# Patient Record
Sex: FEMALE | Employment: UNEMPLOYED | URBAN - METROPOLITAN AREA
[De-identification: names, ages, dates, MRNs, and addresses within clinical notes are randomized per-mention and may not be internally consistent; named-entity substitution may affect disease eponyms.]

---

## 2018-07-09 ENCOUNTER — HOSPITAL ENCOUNTER (OUTPATIENT)
Dept: LAB | Age: 38
Discharge: HOME | End: 2018-07-09
Payer: OTHER GOVERNMENT

## 2018-07-09 LAB
CHLAMYDIA, EXTERNAL: NEGATIVE
HBSAG, EXTERNAL: NEGATIVE
HIV, EXTERNAL: NEGATIVE
N. GONORRHEA, EXTERNAL: NEGATIVE
RPR, EXTERNAL: NEGATIVE
RUBELLA, EXTERNAL: NORMAL
TYPE, ABO & RH, EXTERNAL: NORMAL

## 2018-07-09 PROCEDURE — 88175 CYTOPATH C/V AUTO FLUID REDO: CPT | Performed by: OBSTETRICS & GYNECOLOGY

## 2018-12-19 ENCOUNTER — OFFICE VISIT (OUTPATIENT)
Dept: CARDIOLOGY CLINIC | Age: 38
End: 2018-12-19

## 2018-12-19 VITALS
SYSTOLIC BLOOD PRESSURE: 112 MMHG | WEIGHT: 151 LBS | OXYGEN SATURATION: 98 % | HEART RATE: 99 BPM | DIASTOLIC BLOOD PRESSURE: 74 MMHG

## 2018-12-19 DIAGNOSIS — Z3A.34 34 WEEKS GESTATION OF PREGNANCY: ICD-10-CM

## 2018-12-19 DIAGNOSIS — J45.909 UNCOMPLICATED ASTHMA, UNSPECIFIED ASTHMA SEVERITY, UNSPECIFIED WHETHER PERSISTENT: ICD-10-CM

## 2018-12-19 DIAGNOSIS — R00.2 PALPITATIONS: Primary | ICD-10-CM

## 2018-12-19 DIAGNOSIS — R06.02 SOB (SHORTNESS OF BREATH): ICD-10-CM

## 2018-12-19 DIAGNOSIS — R06.09 DOE (DYSPNEA ON EXERTION): ICD-10-CM

## 2018-12-19 RX ORDER — CLONAZEPAM 2 MG/1
2 TABLET ORAL
COMMUNITY
End: 2018-12-19 | Stop reason: ALTCHOICE

## 2018-12-19 RX ORDER — SWAB
SWAB, NON-MEDICATED MISCELLANEOUS
COMMUNITY
Start: 2010-11-23

## 2018-12-19 RX ORDER — ESOMEPRAZOLE MAGNESIUM 40 MG/1
40 CAPSULE, DELAYED RELEASE ORAL
COMMUNITY
End: 2018-12-19 | Stop reason: ALTCHOICE

## 2018-12-19 NOTE — PROGRESS NOTES
1. Have you been to the ER, urgent care clinic since your last visit? Hospitalized since your last visit? NP_ No     2. Have you seen or consulted any other health care providers outside of the [address] [place] [address] since your last visit? Include any pap smears or colon screening.   NP_ Yes

## 2018-12-26 ENCOUNTER — HOSPITAL ENCOUNTER (OUTPATIENT)
Dept: NON INVASIVE DIAGNOSTICS | Age: 38
Discharge: HOME | End: 2018-12-26
Attending: INTERNAL MEDICINE
Payer: OTHER GOVERNMENT

## 2018-12-26 DIAGNOSIS — R00.2 PALPITATIONS: ICD-10-CM

## 2018-12-26 DIAGNOSIS — R06.02 SOB (SHORTNESS OF BREATH): ICD-10-CM

## 2018-12-26 LAB
ECHO AV PEAK GRADIENT: 0 MMHG
ECHO AV PEAK VELOCITY: 0 CM/S
ECHO EST RA PRESSURE: 10 MMHG
ECHO LA VOL 2C: 43.65 ML (ref 22–52)
ECHO LA VOL 4C: 54.23 ML (ref 22–52)
ECHO LV EDV A2C: 112 ML
ECHO LV EDV A4C: 89.6 ML
ECHO LV EDV BP: 105.8 ML (ref 56–104)
ECHO LV EJECTION FRACTION A2C: 58 %
ECHO LV EJECTION FRACTION A4C: 63 %
ECHO LV EJECTION FRACTION BIPLANE: 59.6 % (ref 55–100)
ECHO LV ESV A2C: 46.5 ML
ECHO LV ESV A4C: 32.9 ML
ECHO LV ESV BP: 42.7 ML (ref 19–49)
ECHO LV INTERNAL DIMENSION DIASTOLIC: 3.72 CM (ref 3.9–5.3)
ECHO LV INTERNAL DIMENSION SYSTOLIC: 2.26 CM
ECHO LV IVSD: 0.91 CM (ref 0.6–0.9)
ECHO LV MASS 2D: 109.8 G (ref 67–162)
ECHO LV POSTERIOR WALL DIASTOLIC: 0.91 CM (ref 0.6–0.9)
ECHO LVOT DIAM: 1.87 CM
ECHO LVOT PEAK GRADIENT: 10.4 MMHG
ECHO LVOT PEAK VELOCITY: 161.55 CM/S
ECHO MV A VELOCITY: 98.01 CM/S
ECHO MV AREA PHT: 16.9 CM2
ECHO MV E DECELERATION TIME (DT): 44.8 MS
ECHO MV E VELOCITY: 0.85 CM/S
ECHO MV E/A RATIO: 0.01
ECHO MV PRESSURE HALF TIME (PHT): 13 MS
ECHO PULMONARY ARTERY SYSTOLIC PRESSURE (PASP): 11.8 MMHG
ECHO RIGHT VENTRICULAR SYSTOLIC PRESSURE (RVSP): 11.8 MMHG
ECHO TV REGURGITANT MAX VELOCITY: -66.38 CM/S
ECHO TV REGURGITANT PEAK GRADIENT: 1.8 MMHG

## 2018-12-26 PROCEDURE — 93306 TTE W/DOPPLER COMPLETE: CPT

## 2019-01-05 PROBLEM — R00.2 PALPITATIONS: Status: ACTIVE | Noted: 2019-01-05

## 2019-01-05 PROBLEM — J45.909 ASTHMA: Status: ACTIVE | Noted: 2019-01-05

## 2019-01-05 PROBLEM — R06.09 DOE (DYSPNEA ON EXERTION): Status: ACTIVE | Noted: 2019-01-05

## 2019-01-05 PROBLEM — Z3A.34 34 WEEKS GESTATION OF PREGNANCY: Status: ACTIVE | Noted: 2019-01-05

## 2019-01-05 NOTE — PROGRESS NOTES
Subjective:      Kamlesh Tabares is in the office today for cardiac evaluation. She is a 57-year-old woman is currently 34 weeks gestation of pregnancy that has been experiencing palpitations and increasing shortness of breath. Patient reported that she comes very short of breath very easily with any effort. She estimates that she can walk approximately half a block for coming so dyspneic that she would have to stop. She has also developed some tightness in chest with walking at times. She reports that she has had palpitations \"all my life \". They occur sporadically. She describes  them as a fluttering. There are no associated symptoms including dizziness. He has worn a Holter monitor in the past without any detectable arrhythmia per patient history. Patient Active Problem List    Diagnosis Date Noted    SEO (dyspnea on exertion) 01/05/2019    34 weeks gestation of pregnancy 01/05/2019    Palpitations 01/05/2019    Asthma 01/05/2019     Current Outpatient Medications   Medication Sig Dispense Refill    prenatal vit-iron fumarate-fa (PRENATAL PLUS WITH IRON) 28 mg iron- 800 mcg tab Take 1 Tab by Mouth Once a Day.  ranitidine HCl (ZANTAC PO) Zantac       Allergies   Allergen Reactions    Latex Anaphylaxis, Hives and Swelling    Iodine Anaphylaxis    Morphine Anaphylaxis    Cephalexin Hives    Sertraline Unknown (comments)     No past medical history on file. No past surgical history on file. No family history on file.   Social History     Tobacco Use   Smoking Status Never Smoker   Smokeless Tobacco Never Used          Review of Systems, additional:  Constitutional: negative  Eyes: negative  Respiratory: positive for dyspnea on exertion  Cardiovascular: positive for dyspnea, palpitations, dyspnea on exertion  Gastrointestinal: negative  Musculoskeletal:negative  Neurological: negative  Behvioral/Psych: negative  Endocrine: negative  ENT: negative    Objective:     Visit Vitals  BP 112/74   Pulse 99   Wt 151 lb (68.5 kg)   SpO2 98%     General:  alert, cooperative, no distress   Chest Wall: inspection normal - no chest wall deformities or tenderness, respiratory effort normal   Lung: clear to auscultation bilaterally   Heart:  normal rate and regular rhythm, S1 and S2 normal, no murmurs noted, no gallops noted, no JVD   Abdomen: soft, non-tender. Bowel sounds normal.  Seems appropriate for 34 weeks gestation. No organomegaly   Extremities: extremities normal, atraumatic, no cyanosis or edema Skin: no rashes   Neuro: alert, oriented, normal speech, no focal findings or movement disorder noted     EK18. Sinus rhythm. Short NH interval    Assessment/Plan:       ICD-10-CM ICD-9-CM    1. Palpitations, will arrange for an echocardiogram to exclude significant structural heart disease. Return 4 weeks. R00.2 785.1 AMB POC EKG ROUTINE W/ 12 LEADS, INTER & REP      ECHO ADULT COMPLETE   2. SOB (shortness of breath) R06.02 786.05 ECHO ADULT COMPLETE   3. 34 weeks gestation of pregnancy Z3A.34 V22.2    4. SEO (dyspnea on exertion) R06.09 786.09    5.  Uncomplicated asthma, unspecified asthma severity, unspecified whether persistent J45.909 493.90

## 2019-01-14 LAB — GRBS, EXTERNAL: NEGATIVE

## 2019-01-16 ENCOUNTER — OFFICE VISIT (OUTPATIENT)
Dept: CARDIOLOGY CLINIC | Age: 39
End: 2019-01-16

## 2019-01-16 VITALS
OXYGEN SATURATION: 98 % | SYSTOLIC BLOOD PRESSURE: 117 MMHG | WEIGHT: 155 LBS | HEART RATE: 105 BPM | BODY MASS INDEX: 24.33 KG/M2 | DIASTOLIC BLOOD PRESSURE: 75 MMHG | HEIGHT: 67 IN

## 2019-01-16 DIAGNOSIS — Z3A.36 36 WEEKS GESTATION OF PREGNANCY: ICD-10-CM

## 2019-01-16 DIAGNOSIS — Z3A.34 34 WEEKS GESTATION OF PREGNANCY: Primary | ICD-10-CM

## 2019-01-16 DIAGNOSIS — R00.2 PALPITATIONS: ICD-10-CM

## 2019-01-16 DIAGNOSIS — J45.909 UNCOMPLICATED ASTHMA, UNSPECIFIED ASTHMA SEVERITY, UNSPECIFIED WHETHER PERSISTENT: ICD-10-CM

## 2019-01-16 DIAGNOSIS — R06.09 DOE (DYSPNEA ON EXERTION): ICD-10-CM

## 2019-01-16 NOTE — PROGRESS NOTES
1. Have you been to the ER, urgent care clinic since your last visit? Hospitalized since your last visit? No    2. Have you seen or consulted any other health care providers outside of the [address] [place] [address] since your last visit? Include any pap smears or colon screening.  No

## 2019-01-27 PROBLEM — Z3A.36 36 WEEKS GESTATION OF PREGNANCY: Status: ACTIVE | Noted: 2019-01-27

## 2019-01-27 NOTE — PROGRESS NOTES
Subjective:      Ramy Dove is in the office today for cardiac evaluation. She is a 61-year-old woman is currently 36 weeks gestation of pregnancy that was experiencing palpitations and increasing shortness of breath which she reported at her 18 visit. .    At that time, she related  that she would be calm  short of breath very easily with any effort. She estimated that with walking approximately half a block, she would become so dyspneic that she would have to stop. She has also developed intermittent tightness in chest with walking at times. She also reported that she had palpitations \"all my life \". They occurred sporadically. She described them as a fluttering. There were no associated symptoms including dizziness. She had worn a Holter monitor in the past without any detectable arrhythmia per patient history. An echocardiogram was ordered and completed on 2018. There was normal systolic function and no significant valvular pathology. In the office today she reports that her symptoms have definitely improved. Her shortness of breath is \"not as intense\". She has had no PND orthopnea. She has had no palpitations, near-syncope or syncope. She has had no significant swelling. Patient Active Problem List    Diagnosis Date Noted    36 weeks gestation of pregnancy 2019    SEO (dyspnea on exertion) 2019    Palpitations 2019    Asthma 2019     Current Outpatient Medications   Medication Sig Dispense Refill    prenatal vit-iron fumarate-fa (PRENATAL PLUS WITH IRON) 28 mg iron- 800 mcg tab Take 1 Tab by Mouth Once a Day.  ranitidine HCl (ZANTAC PO) Zantac       Allergies   Allergen Reactions    Latex Anaphylaxis, Hives and Swelling    Iodine Anaphylaxis    Morphine Anaphylaxis    Cephalexin Hives    Sertraline Unknown (comments)     No past medical history on file. No past surgical history on file. No family history on file.   Social History Tobacco Use   Smoking Status Never Smoker   Smokeless Tobacco Never Used          Review of Systems, additional:  Constitutional: negative  Eyes: negative  Respiratory: positive for dyspnea on exertion  Cardiovascular: positive for dyspnea, palpitations, dyspnea on exertion  Gastrointestinal: negative  Musculoskeletal:negative  Neurological: negative  Behvioral/Psych: negative  Endocrine: negative  ENT: negative    Objective:     Visit Vitals  /75   Pulse (!) 105   Ht 5' 7\" (1.702 m)   Wt 155 lb (70.3 kg)   SpO2 98%   BMI 24.28 kg/m²     General:  alert, cooperative, no distress   Chest Wall: inspection normal - no chest wall deformities or tenderness, respiratory effort normal   Lung: clear to auscultation bilaterally   Heart:  normal rate and regular rhythm, S1 and S2 normal, no murmurs noted, no gallops noted, no JVD   Abdomen: soft, non-tender. Bowel sounds normal.  Seems appropriate for 34 weeks gestation. No organomegaly   Extremities: extremities normal, atraumatic, no cyanosis or edema Skin: no rashes   Neuro: alert, oriented, normal speech, no focal findings or movement disorder noted     EK18. Sinus rhythm. Short SD interval    Assessment/Plan:       ICD-10-CM ICD-9-CM    1. Palpitations, echocardiogram completed 18. Normal systolic function with no significant valvular pathology. R00.2 785.1 AMB POC EKG ROUTINE W/ 12 LEADS, INTER & REP      ECHO ADULT COMPLETE   2. SOB (shortness of breath), symptomatically improved. Return as needed. R06.02 786.05 ECHO ADULT COMPLETE   3. 36 weeks gestation of pregnancy, MARIAJOSE 19 Z3A.34 V22.2    4. SEO (dyspnea on exertion), improved symptomatically R06.09 786.09    5.  Uncomplicated asthma, unspecified asthma severity, unspecified whether persistent J45.909 493.90

## 2019-02-13 ENCOUNTER — ANESTHESIA EVENT (OUTPATIENT)
Dept: LABOR AND DELIVERY | Age: 39
End: 2019-02-13
Payer: OTHER GOVERNMENT

## 2019-02-13 ENCOUNTER — ANESTHESIA (OUTPATIENT)
Dept: LABOR AND DELIVERY | Age: 39
End: 2019-02-13
Payer: OTHER GOVERNMENT

## 2019-02-13 ENCOUNTER — HOSPITAL ENCOUNTER (INPATIENT)
Age: 39
LOS: 2 days | Discharge: HOME | End: 2019-02-15
Attending: OBSTETRICS & GYNECOLOGY | Admitting: OBSTETRICS & GYNECOLOGY
Payer: OTHER GOVERNMENT

## 2019-02-13 PROBLEM — Z15.09: Status: ACTIVE | Noted: 2018-12-10

## 2019-02-13 PROBLEM — Z34.90 ENCOUNTER FOR ELECTIVE INDUCTION OF LABOR: Status: RESOLVED | Noted: 2019-02-13 | Resolved: 2019-02-13

## 2019-02-13 PROBLEM — Z3A.36 36 WEEKS GESTATION OF PREGNANCY: Status: RESOLVED | Noted: 2019-01-27 | Resolved: 2019-02-13

## 2019-02-13 PROBLEM — Z34.90 ENCOUNTER FOR ELECTIVE INDUCTION OF LABOR: Status: ACTIVE | Noted: 2019-02-13

## 2019-02-13 PROBLEM — F32.A DEPRESSIVE DISORDER: Status: ACTIVE | Noted: 2019-02-13

## 2019-02-13 LAB
ABO + RH BLD: NORMAL
BASOPHILS # BLD: 0 K/UL (ref 0–0.1)
BASOPHILS NFR BLD: 0 % (ref 0–2)
BLOOD GROUP ANTIBODIES SERPL: NORMAL
DIFFERENTIAL METHOD BLD: ABNORMAL
EOSINOPHIL # BLD: 0.1 K/UL (ref 0–0.4)
EOSINOPHIL NFR BLD: 1 % (ref 0–5)
ERYTHROCYTE [DISTWIDTH] IN BLOOD BY AUTOMATED COUNT: 12.9 % (ref 11.6–14.5)
HCT VFR BLD AUTO: 35.8 % (ref 35–45)
HGB BLD-MCNC: 12.3 G/DL (ref 12–16)
LYMPHOCYTES # BLD: 1.5 K/UL (ref 0.9–3.6)
LYMPHOCYTES NFR BLD: 18 % (ref 21–52)
MCH RBC QN AUTO: 31.5 PG (ref 24–34)
MCHC RBC AUTO-ENTMCNC: 34.4 G/DL (ref 31–37)
MCV RBC AUTO: 91.8 FL (ref 74–97)
MONOCYTES # BLD: 0.7 K/UL (ref 0.05–1.2)
MONOCYTES NFR BLD: 8 % (ref 3–10)
NEUTS SEG # BLD: 6.3 K/UL (ref 1.8–8)
NEUTS SEG NFR BLD: 73 % (ref 40–73)
PLATELET # BLD AUTO: 163 K/UL (ref 135–420)
PMV BLD AUTO: 9.4 FL (ref 9.2–11.8)
RBC # BLD AUTO: 3.9 M/UL (ref 4.2–5.3)
SPECIMEN EXP DATE BLD: NORMAL
WBC # BLD AUTO: 8.5 K/UL (ref 4.6–13.2)

## 2019-02-13 PROCEDURE — 74011250636 HC RX REV CODE- 250/636: Performed by: NURSE ANESTHETIST, CERTIFIED REGISTERED

## 2019-02-13 PROCEDURE — 74011250636 HC RX REV CODE- 250/636: Performed by: ADVANCED PRACTICE MIDWIFE

## 2019-02-13 PROCEDURE — 74011250636 HC RX REV CODE- 250/636

## 2019-02-13 PROCEDURE — 77030007879 HC KT SPN EPDRL TELE -B: Performed by: NURSE ANESTHETIST, CERTIFIED REGISTERED

## 2019-02-13 PROCEDURE — 85025 COMPLETE CBC W/AUTO DIFF WBC: CPT

## 2019-02-13 PROCEDURE — 3E033VJ INTRODUCTION OF OTHER HORMONE INTO PERIPHERAL VEIN, PERCUTANEOUS APPROACH: ICD-10-PCS | Performed by: ADVANCED PRACTICE MIDWIFE

## 2019-02-13 PROCEDURE — 65270000029 HC RM PRIVATE

## 2019-02-13 PROCEDURE — 74011250637 HC RX REV CODE- 250/637: Performed by: ADVANCED PRACTICE MIDWIFE

## 2019-02-13 PROCEDURE — 10907ZC DRAINAGE OF AMNIOTIC FLUID, THERAPEUTIC FROM PRODUCTS OF CONCEPTION, VIA NATURAL OR ARTIFICIAL OPENING: ICD-10-PCS | Performed by: ADVANCED PRACTICE MIDWIFE

## 2019-02-13 PROCEDURE — 86900 BLOOD TYPING SEROLOGIC ABO: CPT

## 2019-02-13 PROCEDURE — 74011000250 HC RX REV CODE- 250

## 2019-02-13 PROCEDURE — 74011000250 HC RX REV CODE- 250: Performed by: ANESTHESIOLOGY

## 2019-02-13 RX ORDER — PHENYLEPHRINE HCL IN 0.9% NACL 0.4MG/10ML
100 SYRINGE (ML) INTRAVENOUS AS NEEDED
Status: DISCONTINUED | OUTPATIENT
Start: 2019-02-13 | End: 2019-02-14 | Stop reason: HOSPADM

## 2019-02-13 RX ORDER — FENTANYL CITRATE 50 UG/ML
100 INJECTION, SOLUTION INTRAMUSCULAR; INTRAVENOUS ONCE
Status: DISCONTINUED | OUTPATIENT
Start: 2019-02-13 | End: 2019-02-13

## 2019-02-13 RX ORDER — SODIUM CHLORIDE 0.9 % (FLUSH) 0.9 %
5-40 SYRINGE (ML) INJECTION AS NEEDED
Status: DISCONTINUED | OUTPATIENT
Start: 2019-02-13 | End: 2019-02-14 | Stop reason: HOSPADM

## 2019-02-13 RX ORDER — LIDOCAINE HYDROCHLORIDE 10 MG/ML
20 INJECTION, SOLUTION EPIDURAL; INFILTRATION; INTRACAUDAL; PERINEURAL AS NEEDED
Status: DISCONTINUED | OUTPATIENT
Start: 2019-02-13 | End: 2019-02-14 | Stop reason: HOSPADM

## 2019-02-13 RX ORDER — EPHEDRINE SULFATE 50 MG/ML
10 INJECTION, SOLUTION INTRAVENOUS AS NEEDED
Status: DISCONTINUED | OUTPATIENT
Start: 2019-02-13 | End: 2019-02-14 | Stop reason: HOSPADM

## 2019-02-13 RX ORDER — SALICYLIC ACID
90 POWDER (GRAM) MISCELLANEOUS ONCE
Status: DISPENSED | OUTPATIENT
Start: 2019-02-13 | End: 2019-02-13

## 2019-02-13 RX ORDER — TERBUTALINE SULFATE 1 MG/ML
0.25 INJECTION SUBCUTANEOUS
Status: DISCONTINUED | OUTPATIENT
Start: 2019-02-13 | End: 2019-02-14 | Stop reason: HOSPADM

## 2019-02-13 RX ORDER — ACETAMINOPHEN 325 MG/1
650 TABLET ORAL
Status: DISCONTINUED | OUTPATIENT
Start: 2019-02-13 | End: 2019-02-15 | Stop reason: HOSPADM

## 2019-02-13 RX ORDER — METHYLERGONOVINE MALEATE 0.2 MG/ML
0.2 INJECTION INTRAVENOUS AS NEEDED
Status: DISCONTINUED | OUTPATIENT
Start: 2019-02-13 | End: 2019-02-14 | Stop reason: HOSPADM

## 2019-02-13 RX ORDER — PROMETHAZINE HYDROCHLORIDE 25 MG/ML
25 INJECTION, SOLUTION INTRAMUSCULAR; INTRAVENOUS
Status: DISCONTINUED | OUTPATIENT
Start: 2019-02-13 | End: 2019-02-15 | Stop reason: HOSPADM

## 2019-02-13 RX ORDER — HYDROCORTISONE 25 MG/G
CREAM TOPICAL AS NEEDED
Status: DISCONTINUED | OUTPATIENT
Start: 2019-02-13 | End: 2019-02-15 | Stop reason: HOSPADM

## 2019-02-13 RX ORDER — OXYTOCIN/0.9 % SODIUM CHLORIDE 30/500 ML
0-25 PLASTIC BAG, INJECTION (ML) INTRAVENOUS
Status: DISCONTINUED | OUTPATIENT
Start: 2019-02-13 | End: 2019-02-15 | Stop reason: HOSPADM

## 2019-02-13 RX ORDER — ZOLPIDEM TARTRATE 5 MG/1
5 TABLET ORAL
Status: DISCONTINUED | OUTPATIENT
Start: 2019-02-13 | End: 2019-02-15 | Stop reason: HOSPADM

## 2019-02-13 RX ORDER — IBUPROFEN 400 MG/1
800 TABLET ORAL
Status: DISCONTINUED | OUTPATIENT
Start: 2019-02-13 | End: 2019-02-15 | Stop reason: HOSPADM

## 2019-02-13 RX ORDER — OXYTOCIN 10 [USP'U]/ML
10 INJECTION, SOLUTION INTRAMUSCULAR; INTRAVENOUS
Status: DISCONTINUED | OUTPATIENT
Start: 2019-02-13 | End: 2019-02-14 | Stop reason: HOSPADM

## 2019-02-13 RX ORDER — MAG HYDROX/ALUMINUM HYD/SIMETH 200-200-20
15 SUSPENSION, ORAL (FINAL DOSE FORM) ORAL
Status: DISCONTINUED | OUTPATIENT
Start: 2019-02-13 | End: 2019-02-14 | Stop reason: HOSPADM

## 2019-02-13 RX ORDER — ROPIVACAINE HYDROCHLORIDE 2 MG/ML
INJECTION, SOLUTION EPIDURAL; INFILTRATION; PERINEURAL AS NEEDED
Status: DISCONTINUED | OUTPATIENT
Start: 2019-02-13 | End: 2019-02-13 | Stop reason: HOSPADM

## 2019-02-13 RX ORDER — SODIUM CHLORIDE, SODIUM LACTATE, POTASSIUM CHLORIDE, CALCIUM CHLORIDE 600; 310; 30; 20 MG/100ML; MG/100ML; MG/100ML; MG/100ML
125 INJECTION, SOLUTION INTRAVENOUS CONTINUOUS
Status: DISCONTINUED | OUTPATIENT
Start: 2019-02-13 | End: 2019-02-14 | Stop reason: HOSPADM

## 2019-02-13 RX ORDER — MISOPROSTOL 200 UG/1
800 TABLET ORAL
Status: DISPENSED | OUTPATIENT
Start: 2019-02-13 | End: 2019-02-14

## 2019-02-13 RX ORDER — OXYTOCIN/RINGER'S LACTATE 20/1000 ML
999 PLASTIC BAG, INJECTION (ML) INTRAVENOUS ONCE
Status: ACTIVE | OUTPATIENT
Start: 2019-02-13 | End: 2019-02-13

## 2019-02-13 RX ORDER — DIPHENHYDRAMINE HYDROCHLORIDE 50 MG/ML
25 INJECTION, SOLUTION INTRAMUSCULAR; INTRAVENOUS
Status: DISCONTINUED | OUTPATIENT
Start: 2019-02-13 | End: 2019-02-14 | Stop reason: HOSPADM

## 2019-02-13 RX ORDER — SODIUM CHLORIDE 0.9 % (FLUSH) 0.9 %
5-40 SYRINGE (ML) INJECTION EVERY 8 HOURS
Status: DISCONTINUED | OUTPATIENT
Start: 2019-02-13 | End: 2019-02-14 | Stop reason: HOSPADM

## 2019-02-13 RX ORDER — ROPIVACAINE IN 0.9% SOD CHL/PF 0.2 %
PLASTIC BAG, INJECTION (ML) EPIDURAL
Status: DISCONTINUED | OUTPATIENT
Start: 2019-02-13 | End: 2019-02-15

## 2019-02-13 RX ORDER — OXYTOCIN/RINGER'S LACTATE 20/1000 ML
125 PLASTIC BAG, INJECTION (ML) INTRAVENOUS CONTINUOUS
Status: DISCONTINUED | OUTPATIENT
Start: 2019-02-13 | End: 2019-02-14 | Stop reason: HOSPADM

## 2019-02-13 RX ORDER — AMOXICILLIN 250 MG
1 CAPSULE ORAL
Status: DISCONTINUED | OUTPATIENT
Start: 2019-02-13 | End: 2019-02-15 | Stop reason: HOSPADM

## 2019-02-13 RX ADMIN — Medication 125 ML/HR: at 20:53

## 2019-02-13 RX ADMIN — SODIUM CHLORIDE, SODIUM LACTATE, POTASSIUM CHLORIDE, AND CALCIUM CHLORIDE 1000 ML: 600; 310; 30; 20 INJECTION, SOLUTION INTRAVENOUS at 07:56

## 2019-02-13 RX ADMIN — Medication: at 14:44

## 2019-02-13 RX ADMIN — ROPIVACAINE HYDROCHLORIDE 4 ML: 2 INJECTION, SOLUTION EPIDURAL; INFILTRATION; PERINEURAL at 14:42

## 2019-02-13 RX ADMIN — SODIUM CHLORIDE, SODIUM LACTATE, POTASSIUM CHLORIDE, AND CALCIUM CHLORIDE 125 ML/HR: 600; 310; 30; 20 INJECTION, SOLUTION INTRAVENOUS at 14:19

## 2019-02-13 RX ADMIN — OXYTOCIN-SODIUM CHLORIDE 0.9% IV SOLN 30 UNIT/500ML 4 MILLI-UNITS/MIN: 30-0.9/5 SOLUTION at 09:03

## 2019-02-13 RX ADMIN — SODIUM CHLORIDE, SODIUM LACTATE, POTASSIUM CHLORIDE, AND CALCIUM CHLORIDE 1000 ML: 600; 310; 30; 20 INJECTION, SOLUTION INTRAVENOUS at 16:29

## 2019-02-13 RX ADMIN — IBUPROFEN 800 MG: 400 TABLET ORAL at 21:39

## 2019-02-13 RX ADMIN — SODIUM CHLORIDE, SODIUM LACTATE, POTASSIUM CHLORIDE, AND CALCIUM CHLORIDE 500 ML: 600; 310; 30; 20 INJECTION, SOLUTION INTRAVENOUS at 22:10

## 2019-02-13 RX ADMIN — ROPIVACAINE HYDROCHLORIDE 4 ML: 2 INJECTION, SOLUTION EPIDURAL; INFILTRATION; PERINEURAL at 14:44

## 2019-02-13 RX ADMIN — SODIUM CHLORIDE, SODIUM LACTATE, POTASSIUM CHLORIDE, AND CALCIUM CHLORIDE 125 ML/HR: 600; 310; 30; 20 INJECTION, SOLUTION INTRAVENOUS at 11:35

## 2019-02-13 RX ADMIN — OXYTOCIN-SODIUM CHLORIDE 0.9% IV SOLN 30 UNIT/500ML 2 MILLI-UNITS/MIN: 30-0.9/5 SOLUTION at 08:35

## 2019-02-13 RX ADMIN — OXYTOCIN-SODIUM CHLORIDE 0.9% IV SOLN 30 UNIT/500ML 0 MILLI-UNITS/MIN: 30-0.9/5 SOLUTION at 16:22

## 2019-02-13 RX ADMIN — OXYTOCIN-SODIUM CHLORIDE 0.9% IV SOLN 30 UNIT/500ML 6 MILLI-UNITS/MIN: 30-0.9/5 SOLUTION at 09:44

## 2019-02-13 RX ADMIN — SODIUM CHLORIDE, SODIUM LACTATE, POTASSIUM CHLORIDE, AND CALCIUM CHLORIDE 125 ML/HR: 600; 310; 30; 20 INJECTION, SOLUTION INTRAVENOUS at 19:19

## 2019-02-13 NOTE — PROGRESS NOTES
Labor Progress Note Patient seen, fetal heart rate and contraction pattern evaluated, patient examined. Patient Vitals for the past 1 hrs: 
 BP Pulse 19 1315 132/69 92  
19 1301 141/76 (!) 102  
19 1245 (!) 122/110 99  
19 1230 106/56 92 Physical Exam: 
Cervical Exam:  5/80 %/-2/  
Membranes:  Artificial Rupture of Membranes; Amniotic Fluid: small amount of thin meconium fluid Uterine Activity: Frequency: Every 1.5-3 minutes and Duration: 60-80 seconds Fetal Heart Rate: Baseline: 145 per minute Variability: moderate Accelerations: yes Decelerations: early Assessment: IUP 40w2d; Elective IOL for term, at times FHT Cat 2, currently Cat 1. Plan: AROM with permission from [facility]. Good cervical change noted. Continue with current plan. May have epidural PRN, would like to try for un-medicated birth. Anticipate . Dr. Danyelle Ward on unit and updated. Dari Larkin CNM

## 2019-02-13 NOTE — PROGRESS NOTES
Labor Progress Note Patient seen, fetal heart rate and contraction pattern evaluated, patient examined. No data found. Physical Exam: 
Cervical Exam:  3/50 %/-2/  
Membranes:  Intact Uterine Activity: occassional 
Fetal Heart Rate: Baseline: 150 per minute Variability: moderate Accelerations: yes Decelerations: none Assessment: IUP 40w2d; elective IOL , FHT Cat 1 Plan: With permission membranes stripped. Continue titration of Pitocin per unit policy. May consider enema later today. Anticipate . Bijal Busby CNM

## 2019-02-13 NOTE — H&P
History & Physical 
 
Name: Chirag Mina MRN: [id number]  SSN: [id number] YOB: 1980  Age: 41 y.o. Sex: female Subjective:  
 
Estimated Date of Delivery: 19 OB History  Para Term  AB Living 4 1 1   2  1 SAB TAB Ectopic Molar Multiple Live Births 2          1  
  
   
 SAB 
10/15/15 , F, 6lb, 12oz, induced secondary to epigastric pain and dehydration 2016 SAB Chirag Mina is admitted with pregnancy at 40w2d for induction of labor. Prenatal course was complicated by palpitations for which she saw a cardiologist. An echo was done and the findings are stated as \"Normal systolic function with no significant valvular pathology. \" Please see prenatal records for details. She began prenatal care with [facility] on 2018 at 9.0 wks GA confirmed by [facility]. Her total weight gain for this pregnancy has been 25 lbs. Allergies Allergen Reactions  Latex Anaphylaxis, Hives and Swelling  Iodine Anaphylaxis  Morphine Anaphylaxis  Cephalexin Hives  Sertraline Unknown (comments) No family history on file. reports that  has never smoked. she has never used smokeless tobacco. 
 
Objective:  
 
Vitals: 
See nurse's flowsheet Physical Exam: 
Patient without distress. of labor 2 cm dilated 50% effaced   
-2 station Presenting Part: cephalic Cervical Position: mid position, off to maternal right Consistency: Medium Membranes:  Intact Fetal Heart Rate & Contraction pattern: Baseline: 150 per minute Variability: moderate Accelerations: no 
Decelerations: variable Uterine contractions: mild, irregular EFW: 7.5 lbs Prenatal Labs:  
A pos, immune, HIV/HepB/HepC/GC/CT neg, VDRL NR 
 
Group B Strep was negative. Assessment/Plan:  
 
Plan: Admit to 3420/01 for induction of labor.  Discussed interventions for elective IOL and increased possibility of  section over spontaneous onset. Pt wishes to proceed. Will start Pitocin today and if not able to AROM through the day, will stop at  to discuss POC. Anticipate vaginal birth. Lotus Blackwell MD notified. Signed By:  Ladoris Closs, CNM February 13, 2019

## 2019-02-13 NOTE — PROGRESS NOTES
Labor Progress Note Patient seen, fetal heart rate and contraction pattern evaluated, patient examined. Patient Vitals for the past 1 hrs: 
 Temp Resp  
02/13/19 1621 97.9 °F (36.6 °C) 18 Physical Exam: 
Cervical Exam:  8/100 %/-2/  
Membranes:  ruptured; meconium Uterine Activity: Frequency: Every 2-3 minutes and Duration: 80 seconds Fetal Heart Rate: Baseline: 135 per minute Variability: moderate Accelerations: no 
Decelerations: variable periodic; lates resolved with maternal position change Assessment: IUP 40w2d; elective IOL for term, FHT cat 2. Good cervical change,  
Plan: Positioned LL with peanut ball. Pitocin infusion d/c due to tachysystole. Allow to labor down as tolerated per FHT. Mc Jorge CNM

## 2019-02-13 NOTE — PROGRESS NOTES
0725- 40.2 wks presented to L&D for scheduled Elective induction of labor. Pt ambulatory to room 3420 w/ family at side, no distress noted. Pt oriented to room and call light. Pt to br to gown. Jf Ramos at bedside to discuss POC w/ pt for induction of labor. ANAE explained and done. POC for IV pitocin now. 0826-Dr Downs on unit. Dr Gavi Sam reviewed strip and orders received to start pitocin now. 1320-Received report on pt now. Assume care of pt now. 1322-Rn at bedside. Pt asking to stand up on L side of bed. Pericare given w/ linen change. Meconium noted. Nursery called and notified of meconium. 1350-Pt requesting epidural now. IVF bolus given now. 1404-kristel melton crna at bedside for epidural.   
 
1411-epidural time out now. 1420-dr condon called to come evaluate epidural placement. 1430-Pt sitting up for epidural now. Maternal hr noted. 1432-dr egan at bedside. 1437-test dose now. See anesthesia record. 1442-pt back to bed w/ Rn assist.  Pt instructed to stay in bed and call nurse for any needs. 1442- epidural bolus dose now. 1447-epidural done now. 1604-Rn at bedside. Pt repositioned to far L side in bed. 
 
1608-Pt repositioned to far R side in bed.   
 
1620-Dayami Lewis CNM at bedside. O2 on via fm at 12l/min now. Pt repositioned to far L side in bed w/ peanut ball between legs. 1704-dr downs paged 1705-Dr downs called back and requested to review fhr strip and come evaluate pt exam.  Report given on fhr lindsey and late decels and interventions of pitocin off, position change, IV bolus and O2. Dr Vijay Zamudio reports she will come evaluate pt. 
 
1720-Dr downs on unit. Strip reviewed. 1802-Pt attempting to push past anterior cerivx. Minimal progress noted. Pt repositioned to throne position to labor down. Moderate amt of bloody show noted w/ pushing.

## 2019-02-13 NOTE — PROGRESS NOTES
toco readjusted 1112- Turned Lateral Right 1135- 300 ml LR bolus started 1137- turned lateral Left 1145- CNM aware of FHR and interventions

## 2019-02-13 NOTE — ANESTHESIA PROCEDURE NOTES
Epidural Block Start time: 2/13/2019 2:04 PM 
End time: 2/13/2019 2:55 PM 
Performed by: Lexy Yadav CRNA Authorized by: Diandra Jones MD  
 
Pre-Procedure Indication: at surgeon's request and labor epidural   
Preanesthetic Checklist: patient identified, risks and benefits discussed, anesthesia consent, site marked, patient being monitored, timeout performed and anesthesia consent Timeout Time: 14:11 Epidural:  
Patient position:  Seated Prep region:  Lumbar Prep: Patient draped and Chlorhexidine Location:  L3-4 Needle and Epidural Catheter:  
Needle Type:  Tuohy Needle Gauge:  18 G Injection Technique:  Loss of resistance using saline Attempts:  3 Catheter Size:  20 G Catheter at Skin Depth (cm):  12 Depth in Epidural Space (cm):  6 Events: no blood with aspiration, no cerebrospinal fluid with aspiration, no paresthesia and negative aspiration test   
Test Dose:  Negative Assessment:  
Catheter Secured:  Tegaderm and tape Insertion:  Uncomplicated Patient tolerance:  Patient tolerated the procedure well with no immediate complications 1404 Patient consented and potential risk of SHEBA discussed with patient. The risk discussed with patient included: PDPH, backache, non-functioning catheter as the most common risk. Other risk discussed included: hematoma, infection, transient/persistant nerve damage. PATRICIA Light CRNA attempted x 2 needle inserted to 8 cm without GIGI 
1420 Dr. Yari Salazar called 56 Dr. Yari Salazar in room Patient able to move toes bilateral and lift both legs off the bed.

## 2019-02-13 NOTE — PROGRESS NOTES
Labor Progress Note Patient seen, fetal heart rate and contraction pattern evaluated, patient examined. Patient Vitals for the past 1 hrs: 
 Temp Resp  
02/13/19 1800 97.4 °F (36.3 °C) 18 Physical Exam: 
Cervical Exam:  9.5/100 %/0/ Membranes:  ruptured; meconium Uterine Activity: Frequency: Every 1.5-3 minutes and Duration: 60-80 seconds Fetal Heart Rate: Baseline: 150 per minute Variability: moderate, minimal 
Accelerations: no 
Decelerations: variable Assessment: IUP 40w2d; elective IOL for term; FHT Cat 2. Plan: Last exam with cervical lip. Not feeling urge to push. Allowing to labor down as long as fetal heart tones stable. Will attempt to turn Dellis Gala to H&K, to allow for fetal repositioning and descent. Dr. Keaton Figueredo aware of plan and in agreement. Ramon Bragg CNM

## 2019-02-13 NOTE — ANESTHESIA PREPROCEDURE EVALUATION
Anesthetic History No history of anesthetic complications Review of Systems / Medical History Patient summary reviewed, nursing notes reviewed and pertinent labs reviewed Pulmonary Neuro/Psych Cardiovascular Within defined limits GI/Hepatic/Renal 
Within defined limits Endo/Other Within defined limits Other Findings Physical Exam 
 
Airway Mallampati: II 
TM Distance: 4 - 6 cm Neck ROM: normal range of motion Mouth opening: Normal 
 
 Cardiovascular Dental 
No notable dental hx Pulmonary Abdominal 
 
 
 
 Other Findings Anesthetic Plan ASA: 2 Anesthesia type: epidural 
 
 
 
 
 
Anesthetic plan and risks discussed with: Patient

## 2019-02-14 ENCOUNTER — ANESTHESIA (OUTPATIENT)
Dept: MOTHER INFANT UNIT | Age: 39
End: 2019-02-14
Payer: OTHER GOVERNMENT

## 2019-02-14 ENCOUNTER — ANESTHESIA EVENT (OUTPATIENT)
Dept: MOTHER INFANT UNIT | Age: 39
End: 2019-02-14
Payer: OTHER GOVERNMENT

## 2019-02-14 LAB
HCT VFR BLD AUTO: 32.1 % (ref 35–45)
HGB BLD-MCNC: 10.8 G/DL (ref 12–16)

## 2019-02-14 PROCEDURE — 75410000003 HC RECOV DEL/VAG/CSECN EA 0.5 HR

## 2019-02-14 PROCEDURE — 36415 COLL VENOUS BLD VENIPUNCTURE: CPT

## 2019-02-14 PROCEDURE — 76060000078 HC EPIDURAL ANESTHESIA

## 2019-02-14 PROCEDURE — 75410000002 HC LABOR FEE PER 1 HR

## 2019-02-14 PROCEDURE — 65270000029 HC RM PRIVATE

## 2019-02-14 PROCEDURE — 3E0R3GC INTRODUCTION OF OTHER THERAPEUTIC SUBSTANCE INTO SPINAL CANAL, PERCUTANEOUS APPROACH: ICD-10-PCS | Performed by: ANESTHESIOLOGY

## 2019-02-14 PROCEDURE — 85014 HEMATOCRIT: CPT

## 2019-02-14 PROCEDURE — 74011250637 HC RX REV CODE- 250/637: Performed by: ADVANCED PRACTICE MIDWIFE

## 2019-02-14 PROCEDURE — 85018 HEMOGLOBIN: CPT

## 2019-02-14 PROCEDURE — 74011250636 HC RX REV CODE- 250/636

## 2019-02-14 PROCEDURE — 75410000000 HC DELIVERY VAGINAL/SINGLE

## 2019-02-14 RX ORDER — LIDOCAINE HYDROCHLORIDE 20 MG/ML
INJECTION, SOLUTION EPIDURAL; INFILTRATION; INTRACAUDAL; PERINEURAL
Status: COMPLETED | OUTPATIENT
Start: 2019-02-14 | End: 2019-02-14

## 2019-02-14 RX ADMIN — LIDOCAINE HYDROCHLORIDE 3 ML: 20 INJECTION, SOLUTION EPIDURAL; INFILTRATION; INTRACAUDAL; PERINEURAL at 14:10

## 2019-02-14 RX ADMIN — IBUPROFEN 800 MG: 400 TABLET ORAL at 05:26

## 2019-02-14 RX ADMIN — ACETAMINOPHEN 650 MG: 325 TABLET, FILM COATED ORAL at 04:49

## 2019-02-14 RX ADMIN — ACETAMINOPHEN 650 MG: 325 TABLET, FILM COATED ORAL at 08:45

## 2019-02-14 RX ADMIN — ACETAMINOPHEN 650 MG: 325 TABLET, FILM COATED ORAL at 15:14

## 2019-02-14 NOTE — PROGRESS NOTES
Visited with mother and acquired permission to announce baby's name. Gi 3 Board Certified [employer]  
[phone]

## 2019-02-14 NOTE — PROGRESS NOTES
1921: Pushing with Sharon Gillette [facility]  
4854: Murray removed / 200 ml of urine 2008:  of male infant 2030: Alexis Mix CRNA talked to patient about spinal headache. Will leave catheter in and reassess. : Placed cold pad on perineum. : Place pt in bed pan, unable to void. Pt had a bowel movement. Uterus deviated to her right. : Paged Aloha Median. : Advised patient unable to void, pt uterus to right. OK to straight cath if unable to void. : Talked to nurse in [place] [id number], she stated we can straight cath patient to be able to give 500ml bolus recommended by anesthesia for spinal headache. 2210: Pt ambulatory to restroom. Pt stated \"I cannot void in the bed\" advised anesthesia wanted her flat. She insuisted. Up to restroom. Pt stady on her feet. Pt able to void in restroom. Perineal care performed.

## 2019-02-14 NOTE — ROUTINE PROCESS
Saúl Pinzon TRANSFER - IN REPORT: 
 
Verbal report received from Edi Martinez RN on Jaret Ventura  being received from Postpartum for routine progression of care Report consisted of patients Situation, Background, Assessment and  
Recommendations(SBAR). Information from the following report(s) SBAR, Kardex, MAR, Recent Results and Med Rec Status was reviewed with the receiving nurse. Opportunity for questions and clarification was provided. Assessment completed upon patients arrival to unit and care assumed.

## 2019-02-14 NOTE — LACTATION NOTE
Mom states baby is nursing ok. Mom is using side-lying position as she has a spinal headache. Mom concerned about tongue-tie as first child had one that was not caught right away. Discussed positions, latch, nursing pattern/expectations, sucking needs, size of stomach, cluster feeding, milk coming in, hindmilk, wet/dirty diapers. Per Dr. Marley Boys baby does not appear to have a tongue-tie. Encouraged skin-to-skin, offered help with feedings. Discussed outpatient lactation resources. Info sheet, daily log and resource list given. 15:30 - Mom feeding baby in side-lying position, states latch is painful, not wide. Unlatched baby and brought him lower on areola. Discussed latch, positioning. Offered help as needed.

## 2019-02-14 NOTE — PROGRESS NOTES
Post-Partum Day Number 1 Progress Note Jelly Khan is c/o HA that hasnt improved after birth. Was evaluated by anesthesia yesterday and told to rest, drink caffeine, and take ibuprofen. Has been doing this with little help. Baby nursing well, to get a circ today. RN to call anesthesia to have them evaluate for blood patch. If HA improves today and peds D/C baby, would like to go home. Vitals:   
Patient Vitals for the past 8 hrs: 
 BP Temp Pulse Resp SpO2  
19 0245 118/70 98.6 °F (37 °C) 98 14 99 % Temp (24hrs), Av.1 °F (36.7 °C), Min:97.4 °F (36.3 °C), Max:98.6 °F (37 °C) Vital signs stable, afebrile. Exam:  Patient without distress. Breasts intact and nontender Abdomen soft, fundus firm at level of umbilicus, nontender Perineum with normal lochia noted. Lower extremities are negative for swelling, cords or tenderness. Lab/Data Review: All lab results for the last 24 hours reviewed. Assessment and Plan:  Patient appears to be having uncomplicated post-partum course. Continue routine perineal care and maternal education. Plan discharge tomorrow if no problems occur.  
 
Bijal Busby CNM 
2019 
8:03 AM

## 2019-02-14 NOTE — PROGRESS NOTES
Patient transported to PACU via wheelchair for Blood Patch procedure. Patient arrived in the PACU at 1341. Patient transferred to a stretcher and awaited for Dr. Juana Kaufman to enter the room. A time out was called and procedure started. Patient given care instructions and transported back to [facility] via stretcher. Upon arrival on [facility], patient transferred

## 2019-02-14 NOTE — PROGRESS NOTES
RN placed a call to Anesthesia regarding the persistent headaches that the patient has been experiencing. Anesthesia stated that they will come up to assess the patient.

## 2019-02-14 NOTE — L&D DELIVERY NOTE
Delivery Summary    Patient: Manny House MRN: [id number]  SSN: [id number]    YOB: 1980  Age: 41 y.o. Sex: female       Information for the patient's :  Radha Watson [[id number]]     [address]  Baby boy Shavonne Cheek    Variables noted throughout labor. Able to labor down to c/c/+1. Epidural rate reduced to allow pressure and better pushing. Pushing with good effort and progress noted. Shortly there was  of a live male infant using epidural anesthesia. Infant delivered TORY position, no nuchal cord, and thick meconium. Pediatrician at bedside for delivery and assessment. Body delivered without difficulty.  to mothers chest, after cord pulse cessation, cord was doubly clamped and cut by dad. Placenta delivered spontaneously, intact in Dias dann position. Fundus firm, with minimal bleeding. Placenta inspected and intact with 3 vessel cord with eccentric insertion. Perineum and vagina inspected and intact. Infant with Apgars 8/9. EBL 250cc. Pt tolerated procedure well, recovering in LDR. Dr. Sara Medina updated of delivery and patient status. Dyad stable. Labor Events:    Labor: No   Rupture Date: 2019   Rupture Time: 1:04 PM   Rupture Type AROM   Amniotic Fluid Volume:      Amniotic Fluid Description:   None   Induction: Oxytocin       Augmentation: Oxytocin   Labor Events:       Cervical Ripening:           Delivery Events:  Episiotomy: None   Laceration(s):       Repaired: None    Number of Repair Packets:     Suture Type and Size: None     Estimated Blood Loss (ml):  ml       Delivery Date: 2019    Delivery Time: 8:08 PM  Delivery Type: Vaginal, Spontaneous  Sex:  Male     Gestational Age: 41w4d   Delivery Clinician:     Living Status: Living   Delivery Location: L&D            APGARS  One minute Five minutes Ten minutes   Skin color:            Heart rate:            Grimace:            Muscle tone:            Breathing:             Totals:   8   9 Presentation: Vertex    Position:        Resuscitation Method:        Meconium Stained: Thick      Cord Information: 3 Vessels  Complications: None  Cord around:    Delayed cord clamping? Yes  Cord clamped date/time:2019  8:10 PM  Disposition of Cord Blood: Lab    Blood Gases Sent?: No    Placenta:  Date/Time: 2019  8:12 PM  Removal: Spontaneous      Appearance: Normal     [place] Measurements:  Birth Weight:pending    Other Providers:   GRIFFIN Laws;;VALARIE SHORE;NEENA REYES;;;;;, Midwife;Primary Nurse;Primary [place] Nurse;Nicu Nurse;Pediatrician; Anesthesiologist;Crna;Nurse Practitioner;Midwife;Nursery Nurse           Group B Strep:   Lab Results   Component Value Date/Time    GrBStrep, External negative 2019     Information for the patient's :  Vale Springer [[id number]]   No results found for: ABORH, PCTABR, PCTDIG, BILI, ABORHEXT, ABORH    No results for input(s): PCO2CB, PO2CB, HCO3I, SO2I, IBD, PTEMPI, SPECTI, PHICB, ISITE, IDEV, IALLEN in the last 72 hours.

## 2019-02-14 NOTE — PROGRESS NOTES
conducted an Initial Visit and Spiritual Assessment  for Polina Miramontes, who is a 41 y.o.,female. Patient was nursing baby boy Cesario Hyde; her aunt was present. Brief visit, prayer and blessing offered.  Kathryn Washington not present at this time. Cesario Hyde is their second child. Family moving in a few weeks to [facility].  is in the [employer] [facility]. Chaplains will continue to follow and provide pastoral care as needed or requested.  recommends bedside caregivers page  on duty if patient shows signs of acute spiritual or emotional distress. The Rev. Ringwood Dino [address] Spirit[facility] [address] [facility],[address] [facility] [phone] / [address] [phone]

## 2019-02-14 NOTE — ANESTHESIA PROCEDURE NOTES
Blood Patch Performed by: Jenny Mota MD 
Authorized by: Jenny Mota MD  
Start Time:  2/14/2019 1:55 PM 
End Time:  2/14/2019 2:10 PM 
 
Pre-procedure: Indication: spinal headache Preanesthetic Checklist: patient identified, risks and benefits discussed, site marked, anesthesia consent, patient being monitored and timeout performed Blood Patch:  
Monitoring:  Continuous pulse oximetry Location of venous blood draw:  Arm Patient Position:  Seated Prep Region:  Lumbar Prep: Betadine Location:  L3-4 Injection Technique:  GIGI air Needle Type:  Tuohy Needle Gauge:  18 G Sterile Blood Injected (mL):  22 Assessment:  
Attempts:  1 Patient tolerance:  Patient tolerated the procedure well with no immediate complications

## 2019-02-14 NOTE — PROGRESS NOTES
TRANSFER - IN REPORT: 
 
Verbal report received from RULA Keith (name) on Shyann Soriano  being received from L&D(unit) for routine progression of care Report consisted of patients Situation, Background, Assessment and  
Recommendations(SBAR). Information from the following report(s) SBAR, Kardex, Procedure Summary, Intake/Output, MAR, Accordion, Recent Results and Med Rec Status was reviewed with the receiving nurse.

## 2019-02-15 VITALS
OXYGEN SATURATION: 100 % | RESPIRATION RATE: 18 BRPM | HEART RATE: 90 BPM | TEMPERATURE: 98.5 F | DIASTOLIC BLOOD PRESSURE: 75 MMHG | SYSTOLIC BLOOD PRESSURE: 106 MMHG

## 2019-02-15 PROCEDURE — 74011250637 HC RX REV CODE- 250/637: Performed by: ADVANCED PRACTICE MIDWIFE

## 2019-02-15 RX ORDER — IBUPROFEN 800 MG/1
800 TABLET ORAL
Qty: 60 TAB | Refills: 1 | Status: SHIPPED | OUTPATIENT
Start: 2019-02-15

## 2019-02-15 RX ORDER — RANITIDINE 150 MG/1
150 TABLET, FILM COATED ORAL 2 TIMES DAILY
Qty: 60 TAB | Refills: 1 | Status: SHIPPED | OUTPATIENT
Start: 2019-02-15

## 2019-02-15 RX ADMIN — IBUPROFEN 800 MG: 400 TABLET ORAL at 09:23

## 2019-02-15 RX ADMIN — IBUPROFEN 800 MG: 400 TABLET ORAL at 01:15

## 2019-02-15 NOTE — ANESTHESIA POSTPROCEDURE EVALUATION
Post Anesthesia OB Rounds Patient: Tami Dave MRN: [id number]  SSN: [id number] YOB: 1980  Age: 41 y.o. Sex: female Cardiovascular Function/Vital Signs There were no vitals taken for this visit. Patient is status post epidural anesthesia for vaginal delivery. Patient had a post dural puncture headache that required a blood patch yesterday. She stated that she felt immediate relief after the blood patch. Today she is able to walk around with a minimal headache that is controlled with Tylenol. Nausea/Vomiting: None Postoperative hydration reviewed and adequate. Pain: 
   
Managed Neurological Status: At baseline Mental Status and Level of Consciousness: Alert and oriented Pulmonary Status:  
   
Adequate oxygenation and airway patent Complications related to anesthesia: See above Post-anesthesia assessment completed. No concerns The patient is doing well post-delivery. She has no residual numbness and is ambulating. She is afebrile. There is no erythema or edema at the puncture site. Signed By: Wesley Miramontes CRNA February 15, 2019 * No procedures listed *. 
 
<BSHSIANPOST> There were no vitals taken for this visit.

## 2019-02-15 NOTE — DISCHARGE SUMMARY
Obstetrical Discharge Summary       [facility]  [address] [facility]  [phone]        Patient ID:Jennifer SHORT,[id number],89 y.o.,1980    Postpartum Day: Information for the patient's :  Madhuri Richard [[id number]]   3 days       Admit Date: 2019    Discharge Date: 2/15/2019     Admitting Physician: Devika Curtis MD     Admission Diagnoses: Encounter for elective induction of labor [Z34.90]    Discharge Diagnoses: same as above      Additional Diagnoses:Present on Admission:   (Resolved) Encounter for elective induction of labor       Patient Active Problem List   Diagnosis Code    SEO (dyspnea on exertion) R06.09    Palpitations R00.2    Asthma J45.909    Depressive disorder F32.9    Advanced maternal age during pregnancy IMO65   Maureen Lee Carrier of familial adenomatous polyposis Z15.09    Postpartum care following vaginal delivery Z39.2         Procedure:        Baby link  Information for the patient's :  Madhuri Richard [[id number]]     Delivery Type: Vaginal, Spontaneous   Delivery Date: 2019   Delivery Time: 8:08 PM     Birth Weight: 3.49 kg     Sex:  male  Delivery Clinician:      Gestational Age: Gestational Age: 41w4d    Presentation: Vertex   Position:             Apgars were 8  and 9      Resuscitation Method: Suctioning-bulb; Tactile Stimulation     Meconium Stained:  Thick  Living Status: Living       Placenta Date/Time: 2019  8:12 PM   Placenta Removal: Spontaneous   Placenta Appearance: Vertex [1]     Cord Information: 3 Vessels    Cord Events: None       Cord Blood Sent?:       Blood Gases Sent?:  No         Baby procedures:    Information for the patient's :  Madhuri Hiedgar [[id number]]   Procedure to be Performed: Circumcision      Feeding Method: Infant Feeding: Breastmilk    Immunizations:    Immunization History   Administered Date(s) Administered    Influenza Vaccine 10/23/2018    Tdap 12/10/2018        Immunizations:    Immunization History   Administered Date(s) Administered    Influenza Vaccine 10/23/2018    Tdap 12/10/2018       Group Beta Strep:   GrBStrep, External   Date Value Ref Range Status   2019 negative  Final          WBC   Date/Time Value Ref Range Status   2019 07:50 AM 8.5 4.6 - 13.2 K/uL Final   2010 12:22 AM 6.1 4.5 - 13.0 K/uL Final     HGB   Date/Time Value Ref Range Status   2019 05:38 AM 10.8 (L) 12.0 - 16.0 g/dL Final   2019 07:50 AM 12.3 12.0 - 16.0 g/dL Final   2010 12:22 AM 11.2 (L) 12.0 - 16.0 g/dL Final     PLATELET   Date/Time Value Ref Range Status   2019 07:50  135 - 420 K/uL Final         Hospital Course: Unremarkable  Subjective:         Chandu Gleason desires discharge. Headache is better after blood patch. Reviewed  Discharge instructions. Objective:   Breasts Nontender and intact  Fundus firm and involuting  Lochia rubra, minimal  Legs minimal to no edema and without pain. Lab/Data Review:  Patient Vitals for the past 8 hrs:   BP Temp Pulse Resp   02/15/19 0345 100/58 98.2 °F (36.8 °C) 83 18       Temp (24hrs), Av.1 °F (36.7 °C), Min:97.9 °F (36.6 °C), Max:98.2 °F (36.8 °C)      WBC   Date/Time Value Ref Range Status   2019 07:50 AM 8.5 4.6 - 13.2 K/uL Final   2010 12:22 AM 6.1 4.5 - 13.0 K/uL Final     HGB   Date/Time Value Ref Range Status   2019 05:38 AM 10.8 (L) 12.0 - 16.0 g/dL Final   2019 07:50 AM 12.3 12.0 - 16.0 g/dL Final   2010 12:22 AM 11.2 (L) 12.0 - 16.0 g/dL Final     PLATELET   Date/Time Value Ref Range Status   2019 07:50  135 - 420 K/uL Final     Patient Instructions:   Current Discharge Medication List      START taking these medications    Details   ibuprofen (MOTRIN) 800 mg tablet Take 1 Tab by mouth every eight (8) hours as needed. Qty: 60 Tab, Refills: 1      !! raNITIdine (ZANTAC) 150 mg tablet Take 1 Tab by mouth two (2) times a day.   Qty: 60 Tab, Refills: 1       !! - Potential duplicate medications found. Please discuss with provider. CONTINUE these medications which have NOT CHANGED    Details   prenatal vit-iron fumarate-fa (PRENATAL PLUS WITH IRON) 28 mg iron- 800 mcg tab Take 1 Tab by Mouth Once a Day. !! ranitidine HCl (ZANTAC PO) Zantac       !! - Potential duplicate medications found. Please discuss with provider. Assessment:     Status post: postpartum vaginal delivery   Recovering well  Breastfeeding  Mood Stable      Plan:     Postpartum care discussed including diet, ambulation,actvitiy restrictions, and mood fluctuations. Discharge instructions and questions answered for vaginal delivery.   Follow in 6 wks or prn      Signed By: Sawyer Duffy CNM     February 15, 2019

## 2019-02-15 NOTE — ROUTINE PROCESS
Bedside and Verbal shift change report given to Aliya Back (oncoming nurse) by Velasquez Douglas RN (offgoing nurse). Report included the following information SBAR, Intake/Output, MAR and Recent Results.

## 2019-02-15 NOTE — PROGRESS NOTES
2050: Introductions to pt and visitor reviewed plan of care, pt is going to breast feed infant then call this nurse. Denies needs or c/o at this time. 2130; Assessment and vitals signs within normal limits. Denies needs or c/o visitor at bedside. Going to try and breast feed again, assistance offered, but declined by pt. . Will call nurse when finished breast feeding. Nad noted. 2245: Completed nursing, requested infant to nsy until next feed. Verbalizes no needs or c/o at this time. Visitor at bedside. 0000: Sleeping supine nad noted. Visitor at bedside. 0115: C/o pain medicated with 800mg motrin per orders see mar. Infant out to room id band verified. Denies needs or c/o at this time. 0345; Assessment and vital signs wnl. Denies needs or c/o nad noted. Infant to nsy per mom reqeust. 
 
0530: Sleeping supine visitor at bedside. Nad noted. 7007: Infant out to room id band verified. Vss and assessment wnl, voiding and ambulating without difficult. Headache is resolved. Pain controlled by motrin. Pt is on a regular diet.

## 2019-02-15 NOTE — PROGRESS NOTES
0716-Received report of pt now. SBAR reviewed now. 0745-Caffenie provided for pt d/t spinal headache. 0800-Instructions explained to pt for 36 hour testing. 0935-Pt c/o \"when getting up my headache came back\". Pt denies desire to speak w/ anesthesia at this time. 1000-RN at bedside for discharge teaching. Questions and concerns answered. See discharge teaching flowsheet.

## 2019-02-15 NOTE — DISCHARGE INSTRUCTIONS
